# Patient Record
Sex: MALE
[De-identification: names, ages, dates, MRNs, and addresses within clinical notes are randomized per-mention and may not be internally consistent; named-entity substitution may affect disease eponyms.]

---

## 2020-04-26 ENCOUNTER — NURSE TRIAGE (OUTPATIENT)
Dept: OTHER | Facility: CLINIC | Age: 25
End: 2020-04-26

## 2020-04-26 NOTE — TELEPHONE ENCOUNTER
Reason for Disposition   [1] COVID-19 infection diagnosed or suspected AND [2] mild symptoms (fever, cough) AND [3] no trouble breathing or other complications    Answer Assessment - Initial Assessment Questions  1. COVID-19 DIAGNOSIS: \"Who made your Coronavirus (COVID-19) diagnosis? \" \"Was it confirmed by a positive lab test?\" If not diagnosed by a HCP, ask \"Are there lots of cases (community spread) where you live? \" (See public health department website, if unsure)    * MAJOR community spread: high number of cases; numbers of cases are increasing; many people hospitalized. * MINOR community spread: low number of cases; not increasing; few or no people hospitalized      major  2. ONSET: \"When did the COVID-19 symptoms start? \"       today  3. WORST SYMPTOM: \"What is your worst symptom? \" (e.g., cough, fever, shortness of breath, muscle aches)      headache  4. COUGH: \"Do you have a cough? \" If so, ask: \"How bad is the cough? \"        n0  5. FEVER: \"Do you have a fever? \" If so, ask: \"What is your temperature, how was it measured, and when did it start? \"      no  6. RESPIRATORY STATUS: \"Describe your breathing? \" (e.g., shortness of breath, wheezing, unable to speak)       no  7. BETTER-SAME-WORSE: Jamal Dodd you getting better, staying the same or getting worse compared to yesterday? \"  If getting worse, ask, \"In what way? \"      worse  8. HIGH RISK DISEASE: \"Do you have any chronic medical problems? \" (e.g., asthma, heart or lung disease, weak immune system, etc.)      Heart murmur  9. PREGNANCY: \"Is there any chance you are pregnant? \" \"When was your last menstrual period? \"      n/a  10. OTHER SYMPTOMS: \"Do you have any other symptoms? \"  (e.g., runny nose, headache, sore throat, loss of smell)        Headache, congested    Protocols used: CORONAVIRUS (COVID-19) DIAGNOSED OR SUSPECTEDADULTCrystal [facility]